# Patient Record
Sex: MALE | Race: ASIAN | NOT HISPANIC OR LATINO | URBAN - METROPOLITAN AREA
[De-identification: names, ages, dates, MRNs, and addresses within clinical notes are randomized per-mention and may not be internally consistent; named-entity substitution may affect disease eponyms.]

---

## 2017-04-30 ENCOUNTER — EMERGENCY (EMERGENCY)
Facility: HOSPITAL | Age: 47
LOS: 1 days | End: 2017-04-30
Attending: INTERNAL MEDICINE | Admitting: INTERNAL MEDICINE
Payer: COMMERCIAL

## 2017-04-30 VITALS
HEIGHT: 70 IN | HEART RATE: 88 BPM | DIASTOLIC BLOOD PRESSURE: 92 MMHG | SYSTOLIC BLOOD PRESSURE: 157 MMHG | RESPIRATION RATE: 16 BRPM | WEIGHT: 160.06 LBS | OXYGEN SATURATION: 99 % | TEMPERATURE: 98 F

## 2017-04-30 VITALS
SYSTOLIC BLOOD PRESSURE: 123 MMHG | OXYGEN SATURATION: 98 % | RESPIRATION RATE: 16 BRPM | TEMPERATURE: 98 F | HEART RATE: 72 BPM | DIASTOLIC BLOOD PRESSURE: 81 MMHG

## 2017-04-30 PROCEDURE — 99283 EMERGENCY DEPT VISIT LOW MDM: CPT | Mod: 25

## 2017-04-30 PROCEDURE — 12001 RPR S/N/AX/GEN/TRNK 2.5CM/<: CPT

## 2017-04-30 PROCEDURE — 99282 EMERGENCY DEPT VISIT SF MDM: CPT | Mod: 25

## 2021-03-11 ENCOUNTER — EMERGENCY (EMERGENCY)
Facility: HOSPITAL | Age: 51
LOS: 1 days | Discharge: HOME | End: 2021-03-11
Attending: EMERGENCY MEDICINE | Admitting: EMERGENCY MEDICINE
Payer: COMMERCIAL

## 2021-03-11 VITALS
OXYGEN SATURATION: 99 % | SYSTOLIC BLOOD PRESSURE: 153 MMHG | HEART RATE: 88 BPM | HEIGHT: 70 IN | WEIGHT: 160.06 LBS | RESPIRATION RATE: 16 BRPM | TEMPERATURE: 98 F | DIASTOLIC BLOOD PRESSURE: 94 MMHG

## 2021-03-11 LAB
ANION GAP SERPL CALC-SCNC: 9 MMOL/L — SIGNIFICANT CHANGE UP (ref 5–17)
APTT BLD: 28.6 SEC — SIGNIFICANT CHANGE UP (ref 27.5–35.5)
BASOPHILS # BLD AUTO: 0.04 K/UL — SIGNIFICANT CHANGE UP (ref 0–0.2)
BASOPHILS NFR BLD AUTO: 0.9 % — SIGNIFICANT CHANGE UP (ref 0–2)
BUN SERPL-MCNC: 22 MG/DL — SIGNIFICANT CHANGE UP (ref 7–23)
CALCIUM SERPL-MCNC: 9.1 MG/DL — SIGNIFICANT CHANGE UP (ref 8.4–10.5)
CHLORIDE SERPL-SCNC: 101 MMOL/L — SIGNIFICANT CHANGE UP (ref 96–108)
CO2 SERPL-SCNC: 28 MMOL/L — SIGNIFICANT CHANGE UP (ref 22–31)
CREAT SERPL-MCNC: 1.05 MG/DL — SIGNIFICANT CHANGE UP (ref 0.5–1.3)
EOSINOPHIL # BLD AUTO: 0.15 K/UL — SIGNIFICANT CHANGE UP (ref 0–0.5)
EOSINOPHIL NFR BLD AUTO: 3.3 % — SIGNIFICANT CHANGE UP (ref 0–6)
GLUCOSE SERPL-MCNC: 86 MG/DL — SIGNIFICANT CHANGE UP (ref 70–99)
HCT VFR BLD CALC: 44.6 % — SIGNIFICANT CHANGE UP (ref 39–50)
HGB BLD-MCNC: 15.5 G/DL — SIGNIFICANT CHANGE UP (ref 13–17)
IMM GRANULOCYTES NFR BLD AUTO: 0.2 % — SIGNIFICANT CHANGE UP (ref 0–1.5)
INR BLD: 0.97 RATIO — SIGNIFICANT CHANGE UP (ref 0.88–1.16)
LYMPHOCYTES # BLD AUTO: 1.58 K/UL — SIGNIFICANT CHANGE UP (ref 1–3.3)
LYMPHOCYTES # BLD AUTO: 35 % — SIGNIFICANT CHANGE UP (ref 13–44)
MCHC RBC-ENTMCNC: 34.4 PG — HIGH (ref 27–34)
MCHC RBC-ENTMCNC: 34.8 GM/DL — SIGNIFICANT CHANGE UP (ref 32–36)
MCV RBC AUTO: 98.9 FL — SIGNIFICANT CHANGE UP (ref 80–100)
MONOCYTES # BLD AUTO: 0.49 K/UL — SIGNIFICANT CHANGE UP (ref 0–0.9)
MONOCYTES NFR BLD AUTO: 10.9 % — SIGNIFICANT CHANGE UP (ref 2–14)
NEUTROPHILS # BLD AUTO: 2.24 K/UL — SIGNIFICANT CHANGE UP (ref 1.8–7.4)
NEUTROPHILS NFR BLD AUTO: 49.7 % — SIGNIFICANT CHANGE UP (ref 43–77)
NRBC # BLD: 0 /100 WBCS — SIGNIFICANT CHANGE UP (ref 0–0)
PLATELET # BLD AUTO: 219 K/UL — SIGNIFICANT CHANGE UP (ref 150–400)
POTASSIUM SERPL-MCNC: 4.4 MMOL/L — SIGNIFICANT CHANGE UP (ref 3.5–5.3)
POTASSIUM SERPL-SCNC: 4.4 MMOL/L — SIGNIFICANT CHANGE UP (ref 3.5–5.3)
PROTHROM AB SERPL-ACNC: 11.8 SEC — SIGNIFICANT CHANGE UP (ref 10.6–13.6)
RBC # BLD: 4.51 M/UL — SIGNIFICANT CHANGE UP (ref 4.2–5.8)
RBC # FLD: 11.6 % — SIGNIFICANT CHANGE UP (ref 10.3–14.5)
SARS-COV-2 RNA SPEC QL NAA+PROBE: SIGNIFICANT CHANGE UP
SODIUM SERPL-SCNC: 138 MMOL/L — SIGNIFICANT CHANGE UP (ref 135–145)
WBC # BLD: 4.51 K/UL — SIGNIFICANT CHANGE UP (ref 3.8–10.5)
WBC # FLD AUTO: 4.51 K/UL — SIGNIFICANT CHANGE UP (ref 3.8–10.5)

## 2021-03-11 PROCEDURE — 85610 PROTHROMBIN TIME: CPT

## 2021-03-11 PROCEDURE — U0003: CPT

## 2021-03-11 PROCEDURE — 85730 THROMBOPLASTIN TIME PARTIAL: CPT

## 2021-03-11 PROCEDURE — 85025 COMPLETE CBC W/AUTO DIFF WBC: CPT

## 2021-03-11 PROCEDURE — 36415 COLL VENOUS BLD VENIPUNCTURE: CPT

## 2021-03-11 PROCEDURE — 99284 EMERGENCY DEPT VISIT MOD MDM: CPT

## 2021-03-11 PROCEDURE — 80048 BASIC METABOLIC PNL TOTAL CA: CPT

## 2021-03-11 PROCEDURE — U0005: CPT

## 2021-03-11 NOTE — ED PROVIDER NOTE - OBJECTIVE STATEMENT
Patient being discharged with [facility] transport to [place]. Patient educated on discharge instructions. Patient verbalized understanding. PIV removed, telemetry monitor checked in. Patient going home with [facility]. RN to call transport to escort out. Will monitor until patient is off unit.    49 y/o male with no pertinent PMHx presents to the ED c/o visual changes. Pt states he was playing soccer when another player hit him on his left eye. Pt states that intermittently he's had changes in visions, describes a "floater" in left eye on the left side of vision. Pt states he called an ophthalmologist today, who advised pt come to ED for further evaluation. Pt describes discomfort in left eye alongside visual changes. Pt denies a history of opthalmology visits. Denies pain. Denies discomfort or visual changes in right eye. Denies LOC, headache, dizziness, nausea, lightheadedness. No other injuries or complaints at this time. ophthalmologist: Bessy Woods

## 2021-03-11 NOTE — ED ADULT NURSE NOTE - EYE APPEARANCE
asymptomatic " I got hit in the left eye playing soccer Feb 28, I see floaters in my left eye since then, sometimes flashes come and go "/unable to assess

## 2021-03-11 NOTE — ED ADULT NURSE REASSESSMENT NOTE - NS ED NURSE REASSESS COMMENT FT1
received report and assumed care of pt at change of shift. pt awake and alert. NAD. seen and examined by opthalmology. pt d/c to self. verbalized understanding of d/c instructions. ambulated unassisted

## 2021-03-11 NOTE — CONSULT NOTE ADULT - ASSESSMENT
Assessment and Recommendations:  50y male w/o significant pmhx/ochx consulted for new onset flashes and one new floaters of the left eye since two weeks, found to have no e/o retinal tears, holes, or detachments on full dilated exam of the peripheries. Frequency of symptoms decreasing since onset.     1. Posterior Vitreous Detachment, of the left eye  - counseled patient etiology/course of symptomatic floaters  - counseled retinal detachment precautions: monitor for shower of new floaters, worsening of flashing lights, or curtain vision loss  - counseled need for follow-up dilated exam as outpatient  - patient understands and agrees to monitor and follow-up   - Findings discussed with pt and primary team    Outpatient follow-up: Patient should follow-up with his/her ophthalmologist or with [facility] Department of Ophthalmology within 1 week of after discharge at:    [address]  [place] [address]  [phone]    Emma Andrews MD, PGY-III  Pager: [phone]/[facility]: [id number]  Also available on [employer] Teams

## 2021-03-11 NOTE — ED CLERICAL - CLERICAL COMMENTS
paged optho at 1700, 1715 and 1730. paged melinda at 1700, 1715 and 1730. melinda called back at 1730

## 2021-03-11 NOTE — ED PROVIDER NOTE - PATIENT PORTAL LINK FT
You can access the FollowMyHealth Patient Portal offered by [facility] by registering at the following website: [email or web address]. By joining [employer]’s FollowMyHealth portal, you will also be able to view your health information using other applications (apps) compatible with our system.

## 2021-03-11 NOTE — CONSULT NOTE ADULT - SUBJECTIVE AND OBJECTIVE BOX
[facility] DEPARTMENT OF OPHTHALMOLOGY - INITIAL ADULT CONSULT  -----------------------------------------------------------------------------------------------------------------  Emma Andrews MD PGY-III  Pager: [phone]/[facility]: [phone]  -----------------------------------------------------------------------------------------------------------------    HPI: 51 y/o male with no pertinent PMHx presents to the ED c/o visual changes. Pt states he was playing soccer when another player hit him on his left eye two weeks ago. Pt states that intermittently he's had a "floater" in left eye on the left side of vision with occasional flashes of light. Denies curtain vision loss. Pt states he called an ophthalmologist today, who advised pt come to ED for further evaluation.  Pt denies a history of opthalmology visits. Denies pain. Denies discomfort or visual changes in right eye. Denies LOC, headache, dizziness, nausea, lightheadedness. No other injuries or complaints at this time         PAST MEDICAL & SURGICAL HISTORY:  No pertinent past medical history      Past Ocular History: denies  Ophthalmic Medications: denies  FAMILY HISTORY: denies glaucoma    Social History: denies     Allergies & Intolerances:  denies    Review of Systems:  Constitutional: No fever, chills  Eyes: No blurry vision, flashes, floaters, FBS, erythema, discharge, double vision, OU  Neuro: No tremors  Cardiovascular: No chest pain, palpitations  Respiratory: No SOB, no cough  GI: No nausea, vomiting, abdominal pain  : No dysuria  Skin: no rash  Psych: no depression  Endocrine: no polyuria, polydipsia  Heme/lymph: no swelling    VITALS: T(C): 36.8 (03-11-21 @ 16:43)  T(F): 98.3 (03-11-21 @ 16:43), Max: 98.3 (03-11-21 @ 16:43)  HR: 88 (03-11-21 @ 16:43) (88 - 88)  BP: 153/94 (03-11-21 @ 16:43) (153/94 - 153/94)  RR:  (16 - 16)  SpO2:  (99% - 99%)  Wt(kg): --  General: AAO x 3, appropriate mood and affect    Ophthalmology Exam:  Visual acuity (cc): 20/20 OU  Pupils: PERRL OU, no APD  Ttono: 16 OU  Extraocular movements (EOMs): Full OU, no pain, no diplopia  Confrontational Visual Field (CVF): Full OU  Color Plates: 12/12 OU    Pen Light Exam (PLE)  External: Flat OU  Lids/Lashes/Lacrimal Ducts: Flat OU    Sclera/Conjunctiva: W+Q OU  Cornea: Cl OU  Anterior Chamber: D+F OU    Iris: Flat OU  Lens: NS OU    Fundus Exam: dilated with 1% tropicamide and 2.5% phenylephrine  Approval obtained from primary team for dilation  Patient aware that pupils can remained dilated for at least 4-6 hours  Exam performed with 20D lens    Vitreous: wnl OU  Disc, cup/disc: sharp and pink, 0.45 OU  Macula: wnl OU  Vessels: wnl OU  Periphery: wnl without tears/holes/detachments OU    Labs/Imaging:  ***

## 2021-03-11 NOTE — ED PROVIDER NOTE - CLINICAL SUMMARY MEDICAL DECISION MAKING FREE TEXT BOX
Left eye injury 2 weeks ago, now with floaters and white spot in left visual field. PLAN: ophthalmologist evaluation, r/o retinal detachment.

## 2021-03-11 NOTE — ED PROVIDER NOTE - NSFOLLOWUPCLINICS_GEN_ALL_ED_FT
[facility] - Ophthalmology  Ophthalmology  [address]  [place] [address]  Phone: [phone]  Fax:   Follow Up Time:

## 2021-03-11 NOTE — ED PROVIDER NOTE - EYES, MLM
EYES: PERRL, EOMI. There is no corneal defect or abrasion. Fundoscopic exam is normal bilaterally. Visual fields and visual acuity intact.

## 2021-03-11 NOTE — ED PROVIDER NOTE - NSFOLLOWUPINSTRUCTIONS_ED_ALL_ED_FT
Visual Floaters    WHAT YOU NEED TO KNOW:    Floaters are specks that appear to move around in your field of vision. They are dark and shaped like spots or cobwebs. If you try to look straight at them, they seem to dart away.     DISCHARGE INSTRUCTIONS:    Follow up with your healthcare provider as directed: You may be referred to an ophthalmologist. Write down your questions so you remember to ask them during your visits.    Protect your eyes:   •Get annual eye exams. Your ophthalmologist or optometrist will examine your eyes and test your vision.      •Wear a hat and sunglasses with ultraviolet (UV) protection lenses to protect your eyes when you are outdoors.      •Manage your health conditions. See your healthcare provider regularly if you have a health condition that may affect your vision, such as diabetes or high blood pressure.      Contact your healthcare provider or ophthalmologist if:   •You start to see more floaters over time.      •You have eye pain or blurred vision, or light hurts your eyes.      •You have questions or concerns about your care.      Return to the emergency department if:   •You suddenly see more floaters, or flashing lights.      •You have a gradual or sudden loss of vision, or a change in your vision.

## 2021-03-11 NOTE — ED ADULT TRIAGE NOTE - CHIEF COMPLAINT QUOTE
" I got hit in the left eye playing soccer Feb 28, I see floaters in my left eye since then, sometimes flashes come and go "

## 2022-12-31 ENCOUNTER — EMERGENCY (EMERGENCY)
Facility: HOSPITAL | Age: 52
LOS: 1 days | Discharge: HOME | End: 2022-12-31
Attending: EMERGENCY MEDICINE | Admitting: EMERGENCY MEDICINE
Payer: COMMERCIAL

## 2022-12-31 VITALS
TEMPERATURE: 98 F | HEART RATE: 76 BPM | OXYGEN SATURATION: 100 % | DIASTOLIC BLOOD PRESSURE: 91 MMHG | SYSTOLIC BLOOD PRESSURE: 151 MMHG | WEIGHT: 158.73 LBS | HEIGHT: 70 IN | RESPIRATION RATE: 16 BRPM

## 2022-12-31 VITALS
TEMPERATURE: 98 F | OXYGEN SATURATION: 97 % | DIASTOLIC BLOOD PRESSURE: 91 MMHG | SYSTOLIC BLOOD PRESSURE: 133 MMHG | HEART RATE: 70 BPM | RESPIRATION RATE: 15 BRPM

## 2022-12-31 LAB
ALBUMIN SERPL ELPH-MCNC: 3.9 G/DL — SIGNIFICANT CHANGE UP (ref 3.3–5)
ALP SERPL-CCNC: 50 U/L — SIGNIFICANT CHANGE UP (ref 30–120)
ALT FLD-CCNC: 30 U/L DA — SIGNIFICANT CHANGE UP (ref 10–60)
ANION GAP SERPL CALC-SCNC: 5 MMOL/L — SIGNIFICANT CHANGE UP (ref 5–17)
APTT BLD: 27.2 SEC — LOW (ref 27.5–35.5)
AST SERPL-CCNC: 29 U/L — SIGNIFICANT CHANGE UP (ref 10–40)
BASOPHILS # BLD AUTO: 0.03 K/UL — SIGNIFICANT CHANGE UP (ref 0–0.2)
BASOPHILS NFR BLD AUTO: 0.5 % — SIGNIFICANT CHANGE UP (ref 0–2)
BILIRUB SERPL-MCNC: 0.4 MG/DL — SIGNIFICANT CHANGE UP (ref 0.2–1.2)
BUN SERPL-MCNC: 13 MG/DL — SIGNIFICANT CHANGE UP (ref 7–23)
CALCIUM SERPL-MCNC: 9.3 MG/DL — SIGNIFICANT CHANGE UP (ref 8.4–10.5)
CHLORIDE SERPL-SCNC: 100 MMOL/L — SIGNIFICANT CHANGE UP (ref 96–108)
CO2 SERPL-SCNC: 33 MMOL/L — HIGH (ref 22–31)
CREAT SERPL-MCNC: 1.34 MG/DL — HIGH (ref 0.5–1.3)
EGFR: 64 ML/MIN/1.73M2 — SIGNIFICANT CHANGE UP
EOSINOPHIL # BLD AUTO: 0.12 K/UL — SIGNIFICANT CHANGE UP (ref 0–0.5)
EOSINOPHIL NFR BLD AUTO: 1.9 % — SIGNIFICANT CHANGE UP (ref 0–6)
FLUAV AG NPH QL: SIGNIFICANT CHANGE UP
FLUBV AG NPH QL: SIGNIFICANT CHANGE UP
GLUCOSE SERPL-MCNC: 103 MG/DL — HIGH (ref 70–99)
HCT VFR BLD CALC: 42.7 % — SIGNIFICANT CHANGE UP (ref 39–50)
HGB BLD-MCNC: 15 G/DL — SIGNIFICANT CHANGE UP (ref 13–17)
IMM GRANULOCYTES NFR BLD AUTO: 0.2 % — SIGNIFICANT CHANGE UP (ref 0–0.9)
INR BLD: 1.02 RATIO — SIGNIFICANT CHANGE UP (ref 0.88–1.16)
LYMPHOCYTES # BLD AUTO: 1.4 K/UL — SIGNIFICANT CHANGE UP (ref 1–3.3)
LYMPHOCYTES # BLD AUTO: 22.4 % — SIGNIFICANT CHANGE UP (ref 13–44)
MCHC RBC-ENTMCNC: 35 PG — HIGH (ref 27–34)
MCHC RBC-ENTMCNC: 35.1 GM/DL — SIGNIFICANT CHANGE UP (ref 32–36)
MCV RBC AUTO: 99.8 FL — SIGNIFICANT CHANGE UP (ref 80–100)
MONOCYTES # BLD AUTO: 0.54 K/UL — SIGNIFICANT CHANGE UP (ref 0–0.9)
MONOCYTES NFR BLD AUTO: 8.6 % — SIGNIFICANT CHANGE UP (ref 2–14)
NEUTROPHILS # BLD AUTO: 4.15 K/UL — SIGNIFICANT CHANGE UP (ref 1.8–7.4)
NEUTROPHILS NFR BLD AUTO: 66.4 % — SIGNIFICANT CHANGE UP (ref 43–77)
NRBC # BLD: 0 /100 WBCS — SIGNIFICANT CHANGE UP (ref 0–0)
PLATELET # BLD AUTO: 300 K/UL — SIGNIFICANT CHANGE UP (ref 150–400)
POTASSIUM SERPL-MCNC: 3.6 MMOL/L — SIGNIFICANT CHANGE UP (ref 3.5–5.3)
POTASSIUM SERPL-SCNC: 3.6 MMOL/L — SIGNIFICANT CHANGE UP (ref 3.5–5.3)
PROT SERPL-MCNC: 7.7 G/DL — SIGNIFICANT CHANGE UP (ref 6–8.3)
PROTHROM AB SERPL-ACNC: 11.7 SEC — SIGNIFICANT CHANGE UP (ref 10.5–13.4)
RBC # BLD: 4.28 M/UL — SIGNIFICANT CHANGE UP (ref 4.2–5.8)
RBC # FLD: 11.7 % — SIGNIFICANT CHANGE UP (ref 10.3–14.5)
RSV RNA NPH QL NAA+NON-PROBE: SIGNIFICANT CHANGE UP
SARS-COV-2 RNA SPEC QL NAA+PROBE: SIGNIFICANT CHANGE UP
SODIUM SERPL-SCNC: 138 MMOL/L — SIGNIFICANT CHANGE UP (ref 135–145)
TROPONIN I, HIGH SENSITIVITY RESULT: 15.7 NG/L — SIGNIFICANT CHANGE UP
TROPONIN I, HIGH SENSITIVITY RESULT: 17.9 NG/L — SIGNIFICANT CHANGE UP
WBC # BLD: 6.25 K/UL — SIGNIFICANT CHANGE UP (ref 3.8–10.5)
WBC # FLD AUTO: 6.25 K/UL — SIGNIFICANT CHANGE UP (ref 3.8–10.5)

## 2022-12-31 PROCEDURE — 70498 CT ANGIOGRAPHY NECK: CPT | Mod: 26,MA

## 2022-12-31 PROCEDURE — 70498 CT ANGIOGRAPHY NECK: CPT | Mod: MA

## 2022-12-31 PROCEDURE — 70496 CT ANGIOGRAPHY HEAD: CPT | Mod: MA

## 2022-12-31 PROCEDURE — 36415 COLL VENOUS BLD VENIPUNCTURE: CPT

## 2022-12-31 PROCEDURE — 96374 THER/PROPH/DIAG INJ IV PUSH: CPT | Mod: XU

## 2022-12-31 PROCEDURE — 93010 ELECTROCARDIOGRAM REPORT: CPT

## 2022-12-31 PROCEDURE — 85610 PROTHROMBIN TIME: CPT

## 2022-12-31 PROCEDURE — 99285 EMERGENCY DEPT VISIT HI MDM: CPT

## 2022-12-31 PROCEDURE — 80053 COMPREHEN METABOLIC PANEL: CPT

## 2022-12-31 PROCEDURE — 84484 ASSAY OF TROPONIN QUANT: CPT

## 2022-12-31 PROCEDURE — 99285 EMERGENCY DEPT VISIT HI MDM: CPT | Mod: 25

## 2022-12-31 PROCEDURE — 85025 COMPLETE CBC W/AUTO DIFF WBC: CPT

## 2022-12-31 PROCEDURE — 93005 ELECTROCARDIOGRAM TRACING: CPT

## 2022-12-31 PROCEDURE — 70450 CT HEAD/BRAIN W/O DYE: CPT | Mod: MA

## 2022-12-31 PROCEDURE — 70496 CT ANGIOGRAPHY HEAD: CPT | Mod: 26,MA

## 2022-12-31 PROCEDURE — 87637 SARSCOV2&INF A&B&RSV AMP PRB: CPT

## 2022-12-31 PROCEDURE — 96361 HYDRATE IV INFUSION ADD-ON: CPT

## 2022-12-31 PROCEDURE — 85730 THROMBOPLASTIN TIME PARTIAL: CPT

## 2022-12-31 RX ORDER — MECLIZINE HCL 12.5 MG
1 TABLET ORAL
Qty: 15 | Refills: 0
Start: 2022-12-31 | End: 2023-01-04

## 2022-12-31 RX ORDER — SODIUM CHLORIDE 9 MG/ML
1000 INJECTION INTRAMUSCULAR; INTRAVENOUS; SUBCUTANEOUS ONCE
Refills: 0 | Status: COMPLETED | OUTPATIENT
Start: 2022-12-31 | End: 2022-12-31

## 2022-12-31 RX ORDER — ONDANSETRON 8 MG/1
4 TABLET, FILM COATED ORAL ONCE
Refills: 0 | Status: COMPLETED | OUTPATIENT
Start: 2022-12-31 | End: 2022-12-31

## 2022-12-31 RX ORDER — MECLIZINE HCL 12.5 MG
25 TABLET ORAL ONCE
Refills: 0 | Status: COMPLETED | OUTPATIENT
Start: 2022-12-31 | End: 2022-12-31

## 2022-12-31 RX ADMIN — ONDANSETRON 4 MILLIGRAM(S): 8 TABLET, FILM COATED ORAL at 18:17

## 2022-12-31 RX ADMIN — Medication 25 MILLIGRAM(S): at 17:42

## 2022-12-31 RX ADMIN — SODIUM CHLORIDE 1000 MILLILITER(S): 9 INJECTION INTRAMUSCULAR; INTRAVENOUS; SUBCUTANEOUS at 18:05

## 2022-12-31 RX ADMIN — SODIUM CHLORIDE 1000 MILLILITER(S): 9 INJECTION INTRAMUSCULAR; INTRAVENOUS; SUBCUTANEOUS at 17:10

## 2022-12-31 NOTE — ED ADULT TRIAGE NOTE - CHIEF COMPLAINT QUOTE
" I have dizziness - feels like the room is spinning - On and off for 3 days now- I feel like I have a hangover  . I had the flu a week before " (+) mild headache No slurred speech No arm drift No facial drooping noted

## 2022-12-31 NOTE — ED PROVIDER NOTE - CARE PROVIDER_API CALL
García Wiggins  NEUROLOGY  [address] [place] [address]  Phone: [phone]  Fax: [phone]  Follow Up Time: 1-3 Days    [place] [address]  Phone: [phone]  Fax: [phone]  Follow Up Time: 1-3 Days

## 2022-12-31 NOTE — ED PROVIDER NOTE - PATIENT PORTAL LINK FT
You can access the FollowMyHealth Patient Portal offered by [facility] by registering at the following website: [email or web address]. By joining [employer]’s FollowMyHealth portal, you will also be able to view your health information using other applications (apps) compatible with our system.

## 2022-12-31 NOTE — ED PROVIDER NOTE - DIFFERENTIAL DIAGNOSIS
Differential Diagnosis differential including but not limited to CVA vertigo electrolyte abnormality anemia arrhythmia MI

## 2022-12-31 NOTE — ED ADULT NURSE NOTE - OBJECTIVE STATEMENT
52-year-old male  presents with complaint of dizziness x3 days.  States that he has sudden onset of room spinning dizziness on 12/28 while watching a movie.  States that his symptoms have been intermittent since, worse with sudden movements of head with associated nausea.  Patient states that he has mild headache.  States that he had the flu 2 weeks ago.  Denies fever, chest pain, shortness of breath, vision changes, numbness, tingling, focal weakness, slurred speech, rash, photophobia, neck stiffness, vomiting, ringing in ears or other symptoms.

## 2022-12-31 NOTE — ED PROVIDER NOTE - CLINICAL SUMMARY MEDICAL DECISION MAKING FREE TEXT BOX
Patient complaining of sudden onset of room spinning dizziness which began while watching a movie on December 28.  Patient relates dizziness has waxed and waned since then.  Patient reports nausea associated with dizziness.  Patient reports mild headache.  No fevers focal weakness numbness pain short of breath abdominal pain vomiting slurred speech or any other complaints.    Plan EKG CT CTA labs IV fluid Antivert Zofran   differential including but not limited to CVA vertigo electrolyte abnormality anemia arrhythmia MI

## 2022-12-31 NOTE — ED PROVIDER NOTE - NSFOLLOWUPINSTRUCTIONS_ED_ALL_ED_FT
Follow-up with neurologist for re-evaluation, ongoing care and treatment.  Rest, drink plenty of fluids, take meclizine as prescribed.  If having worsening of symptoms or other related symptoms, RETURN TO THE ER IMMEDIATELY.     Vertigo      Vertigo is the feeling that you or the things around you are moving when they are not. This feeling can come and go at any time. Vertigo often goes away on its own. This condition can be dangerous if it happens when you are doing activities like driving or working with machines.    Your doctor will do tests to find the cause of your vertigo. These tests will also help your doctor decide on the best treatment for you.      Follow these instructions at home:      Eating and drinking       A comparison of three sample cups showing dark yellow, yellow, and pale yellow urine.       A sign showing that a person should not drink beer, wine, or liquor.     •Drink enough fluid to keep your pee (urine) pale yellow.      • Do not drink alcohol.      Activity     •Return to your normal activities when your doctor says that it is safe.      •In the morning, first sit up on the side of the bed. When you feel okay, stand slowly while you hold onto something until you know that your balance is fine.      •Move slowly. Avoid sudden body or head movements or certain positions, as told by your doctor.      •Use a cane if you have trouble standing or walking.      •Sit down right away if you feel dizzy.      •Avoid doing any tasks or activities that can cause danger to you or others if you get dizzy.      •Avoid bending down if you feel dizzy. Place items in your home so that they are easy for you to reach without bending or leaning over.      • Do not drive or use machinery if you feel dizzy.      General instructions     •Take over-the-counter and prescription medicines only as told by your doctor.      •Keep all follow-up visits.        Contact a doctor if:    •Your medicine does not help your vertigo.      •Your problems get worse or you have new symptoms.      •You have a fever.      •You feel like you may vomit (nauseous), or this feeling gets worse.      •You start to vomit.      •Your family or friends see changes in how you act.      •You lose feeling (have numbness) in part of your body.      •You feel prickling and tingling in a part of your body.        Get help right away if:    •You are always dizzy.      •You faint.      •You get very bad headaches.      •You get a stiff neck.      •Bright light starts to bother you.      •You have trouble moving or talking.      •You feel weak in your hands, arms, or legs.      •You have changes in your hearing or in how you see (vision).      These symptoms may be an emergency. Get help right away. Call your local emergency services (911 in the [place]   • Do not wait to see if the symptoms will go away.        • Do not drive yourself to the hospital.         Summary    •Vertigo is the feeling that you or the things around you are moving when they are not.      •Your doctor will do tests to find the cause of your vertigo.      •You may be told to avoid some tasks, positions, or movements.      •Contact a doctor if your medicine is not helping, or if you have a fever, new symptoms, or a change in how you act.      •Get help right away if you get very bad headaches, or if you have changes in how you speak, hear, or see.      This information is not intended to replace advice given to you by your health care provider. Make sure you discuss any questions you have with your health care provider.

## 2022-12-31 NOTE — ED PROVIDER NOTE - PROVIDER TOKENS
PROVIDER:[TOKEN:[[id number]],FOLLOWUP:[1-3 Days]],PROVIDER:[TOKEN:[[id number]],FOLLOWUP:[1-3 Days]]

## 2022-12-31 NOTE — ED PROVIDER NOTE - OBJECTIVE STATEMENT
52-year-old male with no past medical history presents with complaint of dizziness x3 days.  States that he has sudden onset of room spinning dizziness on 12/28 while watching a movie.  States that his symptoms have been intermittent since, worse with sudden movements of head with associated nausea.  Patient states that he has mild headache.  States that he had the flu 2 weeks ago.  Denies fever, chest pain, shortness of breath, vision changes, numbness, tingling, focal weakness, slurred speech, rash, photophobia, neck stiffness, vomiting, ringing in ears or other symptoms.

## 2022-12-31 NOTE — ED PROVIDER NOTE - PROGRESS NOTE DETAILS
Reevaluated patient at bedside.  Patient feeling much improved.  Discussed the results of all diagnostic testing in ED and copies of all reports given. Pt ambulatory in ED without any distress. Will dc home on meclizine, f/u neuro and pcp.  An opportunity to ask questions was given.  Discussed the importance of prompt, close medical follow-up.  Patient will return with any changes, concerns or persistent / worsening symptoms.  Understanding of all instructions verbalized.

## 2023-01-01 PROBLEM — Z78.9 OTHER SPECIFIED HEALTH STATUS: Chronic | Status: ACTIVE | Noted: 2021-03-11

## 2023-01-13 PROBLEM — Z00.00 ENCOUNTER FOR PREVENTIVE HEALTH EXAMINATION: Status: ACTIVE | Noted: 2023-01-13

## 2023-12-16 NOTE — ED ADULT TRIAGE NOTE - DIRECT TO ROOM CARE INITIATED:
11-Mar-2021 16:45
Patient requests all Lab, Cardiology, and Radiology Results on their Discharge Instructions

## 2024-08-15 NOTE — ED PROVIDER NOTE - CAS EDP CONSULT WILL SEE PT
The patient's goals for the shift include  Feel better, increase activity    The clinical goals for the shift include safety, increase activity      Pt currently on bedrest.  PT/OT to work with.     shortly

## 2025-04-22 ENCOUNTER — NON-APPOINTMENT (OUTPATIENT)
Age: 55
End: 2025-04-22